# Patient Record
Sex: MALE | Race: WHITE | Employment: OTHER | URBAN - METROPOLITAN AREA
[De-identification: names, ages, dates, MRNs, and addresses within clinical notes are randomized per-mention and may not be internally consistent; named-entity substitution may affect disease eponyms.]

---

## 2018-10-12 ENCOUNTER — ESTABLISHED COMPREHENSIVE EXAM (OUTPATIENT)
Dept: URBAN - METROPOLITAN AREA CLINIC 46 | Facility: CLINIC | Age: 68
End: 2018-10-12

## 2018-10-12 VITALS — HEIGHT: 60 IN | SYSTOLIC BLOOD PRESSURE: 126 MMHG | DIASTOLIC BLOOD PRESSURE: 82 MMHG

## 2018-10-12 DIAGNOSIS — H40.1131: ICD-10-CM

## 2018-10-12 DIAGNOSIS — H04.123: ICD-10-CM

## 2018-10-12 DIAGNOSIS — H40.033: ICD-10-CM

## 2018-10-12 DIAGNOSIS — H25.813: ICD-10-CM

## 2018-10-12 PROCEDURE — 92020 GONIOSCOPY: CPT

## 2018-10-12 PROCEDURE — 92132 CPTRZD OPH DX IMG ANT SGM: CPT

## 2018-10-12 PROCEDURE — 92014 COMPRE OPH EXAM EST PT 1/>: CPT

## 2018-10-12 RX ORDER — TIMOLOL MALEATE OPHTHALMIC GEL FORMING SOLUTION, 0.5% 5 MG/ML: 1 SOLUTION/ DROPS OPHTHALMIC EVERY MORNING

## 2018-10-12 RX ORDER — PREDNISOLONE ACETATE 10 MG/ML: 1 SUSPENSION/ DROPS OPHTHALMIC

## 2018-10-12 ASSESSMENT — TONOMETRY
OS_IOP_MMHG: 18
OD_IOP_MMHG: 18

## 2018-10-12 ASSESSMENT — VISUAL ACUITY
OS_CC: 20/20-1
OD_CC: 20/25+2
OD_SC: J5
OS_SC: J5

## 2018-11-05 ENCOUNTER — SURGERY/PROCEDURE (OUTPATIENT)
Dept: URBAN - METROPOLITAN AREA SURGERY 14 | Facility: SURGERY | Age: 68
End: 2018-11-05

## 2018-11-05 DIAGNOSIS — H40.031: ICD-10-CM

## 2018-11-05 PROCEDURE — 66761 REVISION OF IRIS: CPT

## 2018-11-12 ENCOUNTER — SURGERY/PROCEDURE (OUTPATIENT)
Dept: URBAN - METROPOLITAN AREA SURGERY 14 | Facility: SURGERY | Age: 68
End: 2018-11-12

## 2018-11-12 DIAGNOSIS — H40.032: ICD-10-CM

## 2018-11-12 PROCEDURE — 66761 REVISION OF IRIS: CPT

## 2018-12-07 ENCOUNTER — DILATED FUNDUS EXAM (OUTPATIENT)
Dept: URBAN - METROPOLITAN AREA CLINIC 46 | Facility: CLINIC | Age: 68
End: 2018-12-07

## 2018-12-07 DIAGNOSIS — H40.1131: ICD-10-CM

## 2018-12-07 DIAGNOSIS — H40.032: ICD-10-CM

## 2018-12-07 DIAGNOSIS — H40.031: ICD-10-CM

## 2018-12-07 PROCEDURE — 92250 FUNDUS PHOTOGRAPHY W/I&R: CPT

## 2018-12-07 PROCEDURE — 92132 CPTRZD OPH DX IMG ANT SGM: CPT

## 2018-12-07 PROCEDURE — 92012 INTRM OPH EXAM EST PATIENT: CPT

## 2018-12-07 PROCEDURE — 92020 GONIOSCOPY: CPT

## 2018-12-07 ASSESSMENT — VISUAL ACUITY
OS_SC: 20/20
OD_SC: 20/20

## 2018-12-07 ASSESSMENT — TONOMETRY
OD_IOP_MMHG: 18
OS_IOP_MMHG: 18

## 2019-05-31 ENCOUNTER — IOP CHECK (OUTPATIENT)
Dept: URBAN - METROPOLITAN AREA CLINIC 46 | Facility: CLINIC | Age: 69
End: 2019-05-31

## 2019-05-31 DIAGNOSIS — H40.031: ICD-10-CM

## 2019-05-31 DIAGNOSIS — H40.1131: ICD-10-CM

## 2019-05-31 DIAGNOSIS — H40.032: ICD-10-CM

## 2019-05-31 PROCEDURE — 92012 INTRM OPH EXAM EST PATIENT: CPT

## 2019-05-31 PROCEDURE — 92083 EXTENDED VISUAL FIELD XM: CPT

## 2019-05-31 ASSESSMENT — TONOMETRY
OD_IOP_MMHG: 15
OS_IOP_MMHG: 16

## 2019-05-31 ASSESSMENT — VISUAL ACUITY
OS_SC: 20/20
OD_SC: 20/20

## 2019-09-18 NOTE — PATIENT DISCUSSION
CATARACTS, OU: VISUALLY SIGNIFICANT. OPTION OF SURGERY VERSUS FOLLOWING VERSUS UPDATING GLASSES DISCUSSED. RBA'S DISCUSSED, PATIENT UNDERSTANDS AND DESIRES SURGERY TO INCREASE VISION FOR DRIVING, READING AND GLARE FROM HEADLIGHTS AT NIGHT. SCHEDULE CATARACT SURGERY/PRE-OP OU.

## 2019-09-19 NOTE — PATIENT DISCUSSION
CATARACT OD: RBA'S DISCUSSED, PATIENT UNDERSTANDS AND DESIRES TO PROCEED WITH SURGERY. CONSENT READ AND SIGNED. ACULAR/PREDFORTE/OCUFLOX ESCRIBED TODAY.  PATIENT DESIRES STANDARD SET FOR DISTANCE

## 2019-09-25 NOTE — PATIENT DISCUSSION
Post-Op Instructions: The patient was instructed in the proper use of post-operative eye drops: Ocuflox (Ofloxacin) &amp; Acular (Ketorolac) in the surgical eye 4 times per day for 1 weeks, then discontinue; Predforte (Prednisolone) 4 times per day till the next appointment when tapering instructions will be provided.

## 2019-10-02 NOTE — PATIENT DISCUSSION
CATARACT OS: RBA'S DISCUSSED, PATIENT UNDERSTANDS AND DESIRES TO PROCEED WITH SURGERY. CONSENT READ AND SIGNED. PATIENT DESIRES STANDARD SET FOR NEAR VISION.

## 2019-10-02 NOTE — PATIENT DISCUSSION
CATARACT, OS: VISUALLY SIGNIFICANT. OPTION OF SURGERY VERSUS FOLLOWING VERSUS UPDATING GLASSES DISCUSSED. RBA'S DISCUSSED TODAY WITH DR. PENA, PATIENT UNDERSTANDS AND DESIRES SURGERY TO INCREASE VISION FOR READING AND DRIVING AT NIGHT, REDUCE GLARE. RTC FOR CAT SX OS.

## 2019-10-24 NOTE — PATIENT DISCUSSION
Stopped Today: Ocuflox (ofloxacin): drops: 0.3% 1 drop four times a day as directed into affected eye 09-

## 2019-12-06 ENCOUNTER — IOP CHECK (OUTPATIENT)
Dept: URBAN - METROPOLITAN AREA CLINIC 46 | Facility: CLINIC | Age: 69
End: 2019-12-06

## 2019-12-06 DIAGNOSIS — H40.1131: ICD-10-CM

## 2019-12-06 DIAGNOSIS — H40.031: ICD-10-CM

## 2019-12-06 DIAGNOSIS — H40.032: ICD-10-CM

## 2019-12-06 PROCEDURE — 92012 INTRM OPH EXAM EST PATIENT: CPT

## 2019-12-06 PROCEDURE — 92133 CPTRZD OPH DX IMG PST SGM ON: CPT

## 2019-12-06 ASSESSMENT — TONOMETRY
OS_IOP_MMHG: 15
OD_IOP_MMHG: 15

## 2019-12-06 ASSESSMENT — VISUAL ACUITY
OD_SC: 20/20-1
OS_SC: 20/20-1

## 2020-06-12 ENCOUNTER — ESTABLISHED COMPREHENSIVE EXAM (OUTPATIENT)
Dept: URBAN - METROPOLITAN AREA CLINIC 46 | Facility: CLINIC | Age: 70
End: 2020-06-12

## 2020-06-12 DIAGNOSIS — H40.1131: ICD-10-CM

## 2020-06-12 DIAGNOSIS — H40.032: ICD-10-CM

## 2020-06-12 DIAGNOSIS — H40.031: ICD-10-CM

## 2020-06-12 PROCEDURE — 92250 FUNDUS PHOTOGRAPHY W/I&R: CPT

## 2020-06-12 PROCEDURE — 92014 COMPRE OPH EXAM EST PT 1/>: CPT

## 2020-06-12 ASSESSMENT — TONOMETRY
OD_IOP_MMHG: 16
OS_IOP_MMHG: 14

## 2020-06-12 ASSESSMENT — VISUAL ACUITY
OS_SC: J4
OD_SC: 20/25+1
OD_SC: J4
OS_SC: 20/20-1

## 2021-02-05 ENCOUNTER — IOP CHECK (OUTPATIENT)
Dept: URBAN - METROPOLITAN AREA CLINIC 46 | Facility: CLINIC | Age: 71
End: 2021-02-05

## 2021-02-05 DIAGNOSIS — H40.033: ICD-10-CM

## 2021-02-05 DIAGNOSIS — H40.1131: ICD-10-CM

## 2021-02-05 PROCEDURE — 92012 INTRM OPH EXAM EST PATIENT: CPT

## 2021-02-05 PROCEDURE — 92083 EXTENDED VISUAL FIELD XM: CPT

## 2021-02-05 ASSESSMENT — TONOMETRY
OD_IOP_MMHG: 17
OS_IOP_MMHG: 16

## 2021-02-05 ASSESSMENT — VISUAL ACUITY
OS_SC: 20/20
OD_SC: 20/20-1

## 2021-08-20 ENCOUNTER — DILATED FUNDUS EXAM (OUTPATIENT)
Dept: URBAN - METROPOLITAN AREA CLINIC 46 | Facility: CLINIC | Age: 71
End: 2021-08-20

## 2021-08-20 DIAGNOSIS — H04.123: ICD-10-CM

## 2021-08-20 DIAGNOSIS — H25.813: ICD-10-CM

## 2021-08-20 DIAGNOSIS — H40.033: ICD-10-CM

## 2021-08-20 DIAGNOSIS — H40.1131: ICD-10-CM

## 2021-08-20 PROCEDURE — 92202 OPSCPY EXTND ON/MAC DRAW: CPT

## 2021-08-20 PROCEDURE — 92012 INTRM OPH EXAM EST PATIENT: CPT

## 2021-08-20 PROCEDURE — 92133 CPTRZD OPH DX IMG PST SGM ON: CPT

## 2021-08-20 ASSESSMENT — VISUAL ACUITY
OD_SC: 20/20
OS_SC: 20/20-1

## 2021-08-20 ASSESSMENT — TONOMETRY
OS_IOP_MMHG: 16
OD_IOP_MMHG: 15

## 2022-02-25 ENCOUNTER — COMPREHENSIVE EXAM (OUTPATIENT)
Dept: URBAN - METROPOLITAN AREA CLINIC 46 | Facility: CLINIC | Age: 72
End: 2022-02-25

## 2022-02-25 DIAGNOSIS — H40.033: ICD-10-CM

## 2022-02-25 DIAGNOSIS — H40.1131: ICD-10-CM

## 2022-02-25 PROCEDURE — 92014 COMPRE OPH EXAM EST PT 1/>: CPT

## 2022-02-25 PROCEDURE — 92250 FUNDUS PHOTOGRAPHY W/I&R: CPT

## 2022-02-25 RX ORDER — TIMOLOL MALEATE OPHTHALMIC GEL FORMING SOLUTION, 0.5% 5 MG/ML: 1 SOLUTION/ DROPS OPHTHALMIC EVERY MORNING

## 2022-02-25 ASSESSMENT — VISUAL ACUITY
OS_CC: J2
OD_SC: 20/20-2
OD_CC: J2
OS_SC: 20/20-1

## 2022-02-25 ASSESSMENT — TONOMETRY
OS_IOP_MMHG: 19
OD_IOP_MMHG: 17

## 2022-08-19 ENCOUNTER — FOLLOW UP (OUTPATIENT)
Dept: URBAN - METROPOLITAN AREA CLINIC 46 | Facility: CLINIC | Age: 72
End: 2022-08-19

## 2022-08-19 DIAGNOSIS — H40.033: ICD-10-CM

## 2022-08-19 DIAGNOSIS — H40.1131: ICD-10-CM

## 2022-08-19 PROCEDURE — 92083 EXTENDED VISUAL FIELD XM: CPT

## 2022-08-19 PROCEDURE — 92012 INTRM OPH EXAM EST PATIENT: CPT

## 2022-08-19 RX ORDER — BIMATOPROST 0.1 MG/ML
1 SOLUTION/ DROPS OPHTHALMIC EVERY EVENING
Start: 2022-08-19

## 2022-08-19 ASSESSMENT — VISUAL ACUITY
OU_SC: 20/20-1
OS_SC: 20/20-1
OD_SC: 20/20-2

## 2022-08-19 ASSESSMENT — TONOMETRY
OS_IOP_MMHG: 15
OS_IOP_MMHG: 13
OD_IOP_MMHG: 15
OD_IOP_MMHG: 12

## 2022-09-16 ENCOUNTER — FOLLOW UP (OUTPATIENT)
Dept: URBAN - METROPOLITAN AREA CLINIC 46 | Facility: CLINIC | Age: 72
End: 2022-09-16

## 2022-09-16 DIAGNOSIS — H40.1131: ICD-10-CM

## 2022-09-16 DIAGNOSIS — H40.033: ICD-10-CM

## 2022-09-16 PROCEDURE — 92012 INTRM OPH EXAM EST PATIENT: CPT

## 2022-09-16 ASSESSMENT — TONOMETRY
OS_IOP_MMHG: 15
OD_IOP_MMHG: 14

## 2022-09-16 ASSESSMENT — VISUAL ACUITY
OD_SC: 20/20-1
OS_SC: 20/20

## 2023-09-29 ENCOUNTER — COMPREHENSIVE EXAM (OUTPATIENT)
Dept: URBAN - METROPOLITAN AREA CLINIC 46 | Facility: CLINIC | Age: 73
End: 2023-09-29

## 2023-09-29 DIAGNOSIS — H40.033: ICD-10-CM

## 2023-09-29 DIAGNOSIS — H40.1131: ICD-10-CM

## 2023-09-29 PROCEDURE — 92250 FUNDUS PHOTOGRAPHY W/I&R: CPT

## 2023-09-29 PROCEDURE — 92014 COMPRE OPH EXAM EST PT 1/>: CPT

## 2023-09-29 ASSESSMENT — VISUAL ACUITY
OD_CC: J1
OS_SC: 20/20-1
OS_CC: J2
OD_SC: 20/20-2

## 2023-09-29 ASSESSMENT — TONOMETRY
OD_IOP_MMHG: 14
OS_IOP_MMHG: 16

## 2024-03-29 ENCOUNTER — FOLLOW UP (OUTPATIENT)
Dept: URBAN - METROPOLITAN AREA CLINIC 46 | Facility: CLINIC | Age: 74
End: 2024-03-29

## 2024-03-29 DIAGNOSIS — H40.1131: ICD-10-CM

## 2024-03-29 DIAGNOSIS — H40.033: ICD-10-CM

## 2024-03-29 PROCEDURE — 92012 INTRM OPH EXAM EST PATIENT: CPT

## 2024-03-29 PROCEDURE — 92083 EXTENDED VISUAL FIELD XM: CPT

## 2024-03-29 PROCEDURE — 92020 GONIOSCOPY: CPT

## 2024-03-29 RX ORDER — PREDNISOLONE ACETATE 10 MG/ML: 1 SUSPENSION/ DROPS OPHTHALMIC

## 2024-03-29 ASSESSMENT — VISUAL ACUITY
OS_SC: 20/20
OD_SC: 20/20-2

## 2024-03-29 ASSESSMENT — TONOMETRY
OD_IOP_MMHG: 14
OS_IOP_MMHG: 14

## 2024-04-25 ENCOUNTER — CLINIC PROCEDURE ONLY (OUTPATIENT)
Dept: URBAN - METROPOLITAN AREA CLINIC 43 | Facility: CLINIC | Age: 74
End: 2024-04-25

## 2024-04-25 DIAGNOSIS — H40.1131: ICD-10-CM

## 2024-04-25 DIAGNOSIS — H40.033: ICD-10-CM

## 2024-04-25 PROCEDURE — 6585550 LASER TRABECULOPLASTY

## 2024-04-25 ASSESSMENT — TONOMETRY
OD_IOP_MMHG: 13
OS_IOP_MMHG: 14

## 2024-04-25 ASSESSMENT — VISUAL ACUITY
OD_SC: 20/20-1
OS_SC: 20/25

## 2024-05-23 ENCOUNTER — FOLLOW UP (OUTPATIENT)
Dept: URBAN - METROPOLITAN AREA CLINIC 43 | Facility: CLINIC | Age: 74
End: 2024-05-23

## 2024-05-23 DIAGNOSIS — H40.033: ICD-10-CM

## 2024-05-23 DIAGNOSIS — H40.1131: ICD-10-CM

## 2024-05-23 PROCEDURE — 92012 INTRM OPH EXAM EST PATIENT: CPT

## 2024-05-23 ASSESSMENT — VISUAL ACUITY
OS_SC: 20/20-1
OD_SC: 20/20

## 2024-05-23 ASSESSMENT — TONOMETRY
OS_IOP_MMHG: 11
OD_IOP_MMHG: 10

## 2024-10-16 NOTE — PATIENT DISCUSSION
starting 2 days prior to surgery. Detail Level: Simple Additional Notes: Patient did not want to do 5FU during this visit - will consult at next visit if that’s the route she would like to take. Render Risk Assessment In Note?: no

## 2024-12-03 ENCOUNTER — FOLLOW UP (OUTPATIENT)
Age: 74
End: 2024-12-03

## 2024-12-03 DIAGNOSIS — H40.1131: ICD-10-CM

## 2024-12-03 DIAGNOSIS — H40.033: ICD-10-CM

## 2024-12-03 PROCEDURE — 92133 CPTRZD OPH DX IMG PST SGM ON: CPT

## 2024-12-03 PROCEDURE — 92202 OPSCPY EXTND ON/MAC DRAW: CPT

## 2024-12-03 PROCEDURE — 92012 INTRM OPH EXAM EST PATIENT: CPT

## 2025-06-05 ENCOUNTER — COMPREHENSIVE EXAM (OUTPATIENT)
Age: 75
End: 2025-06-05

## 2025-06-05 DIAGNOSIS — H40.1131: ICD-10-CM

## 2025-06-05 DIAGNOSIS — H25.813: ICD-10-CM

## 2025-06-05 DIAGNOSIS — H04.123: ICD-10-CM

## 2025-06-05 DIAGNOSIS — H40.033: ICD-10-CM

## 2025-06-05 PROCEDURE — 92014 COMPRE OPH EXAM EST PT 1/>: CPT

## 2025-06-05 PROCEDURE — 92250 FUNDUS PHOTOGRAPHY W/I&R: CPT

## 2025-06-19 NOTE — PATIENT DISCUSSION
Referral received via Phone on 6/18/25.    [facility]     Previous imaging completed (pertinent to referral):  See EPIC    Routing to scheduling to coordinate the following:  NEW VASCULAR PATIENT consult with Vascular Surgery.  Please schedule this at next available    Appt note: Referred by self for follow-up angiogram.     Francheska Lang RN  [facility]  Office: [phone]  Fax: [phone]   UV Protection